# Patient Record
Sex: MALE | Race: WHITE | NOT HISPANIC OR LATINO | Employment: FULL TIME | URBAN - METROPOLITAN AREA
[De-identification: names, ages, dates, MRNs, and addresses within clinical notes are randomized per-mention and may not be internally consistent; named-entity substitution may affect disease eponyms.]

---

## 2018-01-21 ENCOUNTER — OFFICE VISIT (OUTPATIENT)
Dept: URGENT CARE | Facility: CLINIC | Age: 28
End: 2018-01-21
Payer: COMMERCIAL

## 2018-01-21 VITALS
TEMPERATURE: 99 F | HEART RATE: 74 BPM | OXYGEN SATURATION: 95 % | BODY MASS INDEX: 28.44 KG/M2 | SYSTOLIC BLOOD PRESSURE: 116 MMHG | DIASTOLIC BLOOD PRESSURE: 70 MMHG | RESPIRATION RATE: 18 BRPM | HEIGHT: 72 IN | WEIGHT: 210 LBS

## 2018-01-21 DIAGNOSIS — S61.011A LACERATION OF RIGHT THUMB WITHOUT DAMAGE TO NAIL, FOREIGN BODY PRESENCE UNSPECIFIED, INITIAL ENCOUNTER: Primary | ICD-10-CM

## 2018-01-21 PROCEDURE — 99203 OFFICE O/P NEW LOW 30 MIN: CPT | Mod: 25,S$GLB,, | Performed by: FAMILY MEDICINE

## 2018-01-21 PROCEDURE — 12041 INTMD RPR N-HF/GENIT 2.5CM/<: CPT | Mod: S$GLB,,, | Performed by: FAMILY MEDICINE

## 2018-01-21 RX ORDER — AMOXICILLIN AND CLAVULANATE POTASSIUM 875; 125 MG/1; MG/1
1 TABLET, FILM COATED ORAL 2 TIMES DAILY
Qty: 20 TABLET | Refills: 0 | Status: SHIPPED | OUTPATIENT
Start: 2018-01-21 | End: 2018-01-31

## 2018-01-21 RX ORDER — MUPIROCIN 20 MG/G
OINTMENT TOPICAL
Qty: 22 G | Refills: 1 | Status: SHIPPED | OUTPATIENT
Start: 2018-01-21

## 2018-01-21 NOTE — LETTER
January 21, 2018      [facility]  [address] [address]  Phone: [phone]  Fax: [phone]       Patient: Doug Gray   YOB: 1990  Date of Visit: 01/21/2018    To Whom It May Concern:    Jerson Gray  was at [facility] on 01/21/2018. He may return to work/school on 01/25/2018 with limited use of right hand. If you have any questions or concerns, or if I can be of further assistance, please do not hesitate to contact me.    Sincerely,    Darshana Rivera MA

## 2018-01-21 NOTE — PROGRESS NOTES
Subjective:       Patient ID: Doug Gray is a 27 y.o. male.    Vitals:  height is 6' (1.829 m) and weight is 95.3 kg (210 lb). His oral temperature is 98.8 °F (37.1 °C). His blood pressure is 116/70 and his pulse is 74. His respiration is 18 and oxygen saturation is 95%.     Chief Complaint: Laceration    Right thumb laceration today with glass pitcher.  Pt UTD on tetnaus.  Had shot 3 years ago.      Laceration    The incident occurred 1 to 3 hours ago. The laceration is located on the right hand. The laceration is 4 cm in size. The laceration mechanism was a broken glass. The pain is at a severity of 3/10. The pain is mild. The pain has been worsening since onset. It is unknown if a foreign body is present. His tetanus status is UTD.     Review of Systems   Constitution: Negative for weakness and malaise/fatigue.   HENT: Negative for nosebleeds.    Cardiovascular: Negative for chest pain and syncope.   Respiratory: Negative for shortness of breath.    Musculoskeletal: Positive for joint pain. Negative for back pain and neck pain.   Gastrointestinal: Negative for abdominal pain.   Genitourinary: Negative for hematuria.   Neurological: Negative for dizziness and numbness.       Objective:      Physical Exam   Constitutional: He appears well-developed and well-nourished.   HENT:   Head: Normocephalic and atraumatic.   Cardiovascular: Normal rate, regular rhythm and normal heart sounds.    Skin: Laceration (2.5 cm laceration lateral aspect of right thumb, no tendon or nerve involvement. able to appostion without difficulty) noted.   Nursing note and vitals reviewed.      Assessment:       1. Laceration of right thumb without damage to nail, foreign body presence unspecified, initial encounter        Plan:         Laceration of right thumb without damage to nail, foreign body presence unspecified, initial encounter  -     X-Ray Finger 2 or More Views; Future; Expected date: 01/21/2018  -     amoxicillin-clavulanate  875-125mg (AUGMENTIN) 875-125 mg per tablet; Take 1 tablet by mouth 2 (two) times daily.  Dispense: 20 tablet; Refill: 0  -     mupirocin (BACTROBAN) 2 % ointment; Apply to affected area 3 times daily  Dispense: 22 g; Refill: 1  -     LACERATION REPAIR      Return in 4 days for wound check. Discussed signs and symptoms of infection and wound care

## 2018-01-22 NOTE — PATIENT INSTRUCTIONS
Extremity Laceration: Sutures, Staples, or Tape  A laceration is a cut through the skin. If it is deep, it may require stitches (sutures) or staples to close so it can heal. Minor cuts may be treated with surgical tape closures.   X-rays may be done if something may have entered the skin through the cut. You may also need a tetanus shot if you are not up to date on this vaccination.  Home care  · Follow the health care providers instructions on how to care for the cut.  · Wash your hands with soap and warm water before and after caring for your wound. This is to help prevent infection.  · Keep the wound clean and dry. If a bandage was applied and it becomes wet or dirty, replace it. Otherwise, leave it in place for the first 24 hours, then change it once a day or as directed.  · If sutures or staples were used, clean the wound daily:  · After removing the bandage, wash the area with soap and water. Use a wet cotton swab to loosen and remove any blood or crust that forms.  · After cleaning, keep the wound clean and dry. Talk with your doctor before applying any antibiotic ointment to the wound. Reapply the bandage.  · You may remove the bandage to shower as usual after the first 24 hours, but do not soak the area in water (no swimming) until the stitches or staples are removed.  · If surgical tape closures were used, keep the area clean and dry. If it becomes wet, blot it dry with a towel.  · The doctor may prescribe an antibiotic cream or ointment to prevent infection. Do not stop taking this medication until you have finished the prescribed course or the doctor tells you to stop. The doctor may also prescribe medications for pain. Follow the doctors instructions for taking these medications.  · Avoid activities that may reopen your wound.  Follow-up care  Follow up with your health care provider. Most skin wounds heal within ten days. However, an infection may sometimes occur despite proper treatment.  Therefore, check the wound daily for the signs of infection listed below. Stitches and staples should be removed within 7-14 days. If surgical tape closures were used, you may remove them after 10 days if they have not fallen off by then.   When to seek medical advice  Call your health care provider right away if any of these occur:  · Wound bleeding not controlled by direct pressure  · Signs of infection, including increasing pain in the wound, increasing wound redness or swelling, or pus or bad odor coming from the wound  · Fever of 100.4°F (38ºC) or higher or as directed by your healthcare provider  · Stitches or staples come apart or fall out or surgical tape falls off before 7 days  · Wound edges re-open  · Wound changes colors  · Numbness around the wound   · Decreased movement around the injured area  Date Last Reviewed: 6/14/2015  © 8710-5030 [employer]. [address] [place] [address], [place] [address]. All rights reserved. This information is not intended as a substitute for professional medical care. Always follow your healthcare professional's instructions.

## 2018-01-22 NOTE — PROCEDURES
"Laceration Repair  Date/Time: 2018 7:05 PM  Performed by: DANGELO MAURO.  Authorized by: DANGELO MAURO   Consent Done: Yes  Consent: Verbal consent obtained. Written consent obtained.  Risks and benefits: risks, benefits and alternatives were discussed  Consent given by: patient  Patient understanding: patient states understanding of the procedure being performed  Patient consent: the patient's understanding of the procedure matches consent given  Patient identity confirmed:  and name  Time out: Immediately prior to procedure a "time out" was called to verify the correct patient, procedure, equipment, support staff and site/side marked as required.  Body area: upper extremity  Location details: right thumb  Laceration length: 2.5 cm  Foreign bodies: no foreign bodies  Tendon involvement: none  Nerve involvement: none  Vascular damage: no  Anesthesia: local infiltration    Anesthesia:  Local Anesthetic: lidocaine 1% without epinephrine  Anesthetic total: 3 mL  Patient sedated: no  Irrigation solution: saline  Irrigation method: syringe  Amount of cleaning: extensive  Debridement: none  Degree of undermining: none  Skin closure: 3-0 nylon  Number of sutures: 6  Technique: simple  Approximation: close  Approximation difficulty: simple  Dressing: splint for protection, antibiotic ointment and bulky dressing  Patient tolerance: Patient tolerated the procedure well with no immediate complications        "

## 2018-01-25 ENCOUNTER — OFFICE VISIT (OUTPATIENT)
Dept: URGENT CARE | Facility: CLINIC | Age: 28
End: 2018-01-25
Payer: COMMERCIAL

## 2018-01-25 VITALS
HEIGHT: 72 IN | TEMPERATURE: 98 F | OXYGEN SATURATION: 96 % | BODY MASS INDEX: 28.44 KG/M2 | WEIGHT: 210 LBS | SYSTOLIC BLOOD PRESSURE: 104 MMHG | RESPIRATION RATE: 18 BRPM | HEART RATE: 60 BPM | DIASTOLIC BLOOD PRESSURE: 74 MMHG

## 2018-01-25 DIAGNOSIS — S61.011D LACERATION OF RIGHT THUMB WITHOUT FOREIGN BODY WITHOUT DAMAGE TO NAIL, SUBSEQUENT ENCOUNTER: Primary | ICD-10-CM

## 2018-01-25 PROCEDURE — 99213 OFFICE O/P EST LOW 20 MIN: CPT | Mod: 25,S$GLB,, | Performed by: FAMILY MEDICINE

## 2018-01-25 NOTE — PROGRESS NOTES
Subjective:       Patient ID: Doug Gray is a 27 y.o. male.    Vitals:  height is 6' (1.829 m) and weight is 95.3 kg (210 lb). His oral temperature is 97.7 °F (36.5 °C). His blood pressure is 104/74 and his pulse is 60. His respiration is 18 and oxygen saturation is 96%.     Chief Complaint: Wound Check    Pt had stitiches placed 1/21/18.  Here today to have stitches removed.  No pain, redness or swelling to site.      Suture / Staple Removal   The sutures were placed 3 to 6 days ago. He tried oral antibiotics since the wound repair. The treatment provided mild relief. His temperature was unmeasured prior to arrival. There has been colored (yellow) discharge from the wound. There is no redness present. There is no swelling present. There is no pain present. He has no difficulty moving the affected extremity or digit.     Review of Systems   Constitution: Negative for fever.   Eyes: Negative for discharge.   Skin: Negative for unusual hair distribution.       Objective:      Physical Exam   Cardiovascular: Normal rate, regular rhythm and normal heart sounds.    Pulmonary/Chest: Effort normal and breath sounds normal.   Skin: Laceration (healing wound mildly tender, neatly approximated) noted.   Nursing note and vitals reviewed.      Assessment:       1. Laceration of right thumb without foreign body without damage to nail, subsequent encounter        Plan:         Laceration of right thumb without foreign body without damage to nail, subsequent encounter    continue with current management - advised to decrease use of topical antibiotics. To return for signs of infection. To have sutures removed after 10 days

## 2018-01-25 NOTE — PATIENT INSTRUCTIONS
Wound Check, No Infection  Your wound is healing as expected. There are no signs of infection.   Home care  Continue to care for your wound as directed.  · Cover your wound with a bandage unless your healthcare provider tells you not to.  · Gently clean your wound with soap and water when you shower.   · Unless told otherwise, avoid swimming and taking tub baths until your wound has healed.  Follow-up care  Follow up with your healthcare provider as advised.  · If you have sutures or staples, return as directed to have them removed. If they are not taken out on time, they may be harder to remove and scarring may be worse. Infection may develop.  · If surgical tape strips were used, you can remove them yourself if they have not fallen off by 10 days after they were applied.   When to seek medical advice  Call your healthcare provider right away if any of these occur:  · Fever of 100.4ºF (38ºC) or higher, or as directed by your health care provider  · Symptoms of a wound infection, including:  ¨ Redness or swelling around the wound  ¨ Warmth coming from the wound  ¨ New or worsening pain  ¨ Red streaks around the wound  ¨ Draining pus  Date Last Reviewed: 8/24/2015  © 5702-4773 [employer]. [address] [place] [address], [place] [address]. All rights reserved. This information is not intended as a substitute for professional medical care. Always follow your healthcare professional's instructions.

## 2018-01-31 ENCOUNTER — CLINICAL SUPPORT (OUTPATIENT)
Dept: URGENT CARE | Facility: CLINIC | Age: 28
End: 2018-01-31
Payer: COMMERCIAL

## 2018-01-31 VITALS
DIASTOLIC BLOOD PRESSURE: 73 MMHG | OXYGEN SATURATION: 99 % | RESPIRATION RATE: 18 BRPM | HEART RATE: 73 BPM | TEMPERATURE: 97 F | HEIGHT: 72 IN | WEIGHT: 210 LBS | BODY MASS INDEX: 28.44 KG/M2 | SYSTOLIC BLOOD PRESSURE: 112 MMHG

## 2018-01-31 DIAGNOSIS — Z48.02 ENCOUNTER FOR REMOVAL OF SUTURES: Primary | ICD-10-CM

## 2018-01-31 PROCEDURE — 99024 POSTOP FOLLOW-UP VISIT: CPT | Mod: S$GLB,,, | Performed by: FAMILY MEDICINE

## 2018-01-31 NOTE — PATIENT INSTRUCTIONS
Continue using the Bactroban ointment. Keep it clean and dry. Return for new or worsening symptoms: fever, drainage, surrounding redness/warmth.  Suture Removal, Infected Wound  Your sutures are being removed. Your wound has become infected. The wound will not heal properly unless the infection is cleared. Infection in a wound may also spread if it is not treated. In most cases, antibiotic medicines are prescribed to treat a wound infection.  Symptoms of a wound infection include:  · Redness or swelling around the wound  · Warmth coming from the wound  · New or worsening pain  · Red streaks around the wound  · Draining pus  · Fever  Home care  Medicines  · If you were given antibiotics, take them until they are gone or your healthcare provider tells you to stop. It is vital to finish the antibiotics even if you feel better. If you do not finish them, the infection may come back and be harder to treat.  · Take medicine for pain as directed by your healthcare provider.  Wound care  Care for your wound as directed by the healthcare provider. This may include the following:  · Apply a warm compress (clean cloth soaked in hot water) to the infected area for about 5 to 10 minutes at a time. Be very careful not to burn yourself. Test the cloth on a non-infected area to make sure it is not too hot.  · Change the dressing daily. If it becomes wet, stained with wound fluid, or dirty, change it sooner. To change it:  ¨ Wash your hands with soap and water before changing the dressing.  ¨ Carefully remove the dressing and tape. If it sticks to the wound, you may need to wet it a little to remove it. (Do not do this if your healthcare provider has told you not to.)  ¨ Gently clean the wound with clean water (or saline) using gauze, a clean washcloth, or cotton swab.  ¨ Do not use soap, alcohol, peroxide or other cleansers.  ¨ If you were told to dry the wound before putting on a new dressing, gently pat. Do not rub.  ¨ Throw  out the old dressing.  ¨ Wash your hands again before opening the new, clean dressing.  ¨ Wash your hands again when you are done.  Follow-up care  Follow up with your healthcare provider as advised. It is important to keep your follow-up appointment to be certain that the infection is being treated. If a culture was done, you will be notified if the treatment needs to change. Call as directed for the results.  When to seek medical advice  Call your health care provider right away if any of these occur:  · Symptoms of infection don't start to improve within 2 days of starting antibiotics.  · Symptoms of infection get worse.  · New symptoms, such as red streaks around the wound.  · Fever of 100.4°F (38.0°C) or higher for more than 2 days after starting the antibiotics  Date Last Reviewed: 8/10/2015  © 9228-3343 [employer]. [address] [place] [address]. All rights reserved. This information is not intended as a substitute for professional medical care. Always follow your healthcare professional's instructions.

## 2018-01-31 NOTE — PROGRESS NOTES
Subjective:       Patient ID: Doug Gray is a 27 y.o. male.    Vitals:  height is 6' (1.829 m) and weight is 95.3 kg (210 lb). His oral temperature is 97 °F (36.1 °C). His blood pressure is 112/73 and his pulse is 73. His respiration is 18 and oxygen saturation is 99%.     Chief Complaint: Suture / Staple Removal    Patient states he is here to get sutures removed from his right thumb. Patient states suture have been placed in right thumb for 10 days.      Suture / Staple Removal   The sutures were placed 7 to 10 days ago. He tried antibiotic ointment use and oral antibiotics since the wound repair. The treatment provided mild relief. There has been no drainage from the wound. There is no redness present. There is no swelling present. There is no pain present. He has no difficulty moving the affected extremity or digit.     Review of Systems   Constitution: Negative for fever.   Eyes: Negative for discharge.   Skin: Negative for color change, itching, poor wound healing and unusual hair distribution.       Objective:      Physical Exam   Constitutional: He is oriented to person, place, and time. He appears well-developed and well-nourished.   HENT:   Head: Normocephalic and atraumatic. Head is without abrasion, without contusion and without laceration.   Eyes: Conjunctivae, EOM and lids are normal. Pupils are equal, round, and reactive to light.   Neck: Trachea normal, full passive range of motion without pain and phonation normal. Neck supple.   Cardiovascular: Normal rate, regular rhythm and normal heart sounds.    Pulmonary/Chest: Effort normal and breath sounds normal. No stridor. No respiratory distress.   Musculoskeletal: Normal range of motion.   Neurological: He is alert and oriented to person, place, and time.   Skin: Skin is warm, dry and intact. Capillary refill takes less than 2 seconds. No abrasion, no bruising, no burn, no ecchymosis, no laceration, no lesion and no rash noted. No erythema.   Healing  laceration with 5 sutures intact, no drainage/warmth/redness noted    Psychiatric: He has a normal mood and affect. His speech is normal and behavior is normal. Judgment and thought content normal. Cognition and memory are normal.   Nursing note and vitals reviewed.        Suture Removal  Date/Time: 1/31/2018 4:05 PM  Location procedure was performed: [facility]  Performed by: EDDIE TILLMAN  Authorized by: EDDIE TILLMAN   Pre-operative diagnosis: laceration  Body area: upper extremity  Location details: right thumb  Description of findings: healing laceration without complications   Wound Appearance: clean, normal color, nontender and no drainage  Sutures Removed: 5 (pt states initially 6 sutures were placed, one fell out )  Post-removal: dressing applied (pt advised to keep area uncovered but keep clean and dry; continue use of bactroban )  Facility: sutures placed in this facility  Complications: No  Estimated blood loss (mL): 0  Specimens: No  Implants: No  Patient tolerance: Patient tolerated the procedure well with no immediate complications        Assessment:       1. Encounter for removal of sutures        Plan:         Encounter for removal of sutures    Other orders  -     SUTURE REMOVAL    Patient Instructions     Continue using the Bactroban ointment. Keep it clean and dry. Return for new or worsening symptoms: fever, drainage, surrounding redness/warmth.  Suture Removal, Infected Wound  Your sutures are being removed. Your wound has become infected. The wound will not heal properly unless the infection is cleared. Infection in a wound may also spread if it is not treated. In most cases, antibiotic medicines are prescribed to treat a wound infection.  Symptoms of a wound infection include:  · Redness or swelling around the wound  · Warmth coming from the wound  · New or worsening pain  · Red streaks around the wound  · Draining pus  · Fever  Home care  Medicines  · If you were given  antibiotics, take them until they are gone or your healthcare provider tells you to stop. It is vital to finish the antibiotics even if you feel better. If you do not finish them, the infection may come back and be harder to treat.  · Take medicine for pain as directed by your healthcare provider.  Wound care  Care for your wound as directed by the healthcare provider. This may include the following:  · Apply a warm compress (clean cloth soaked in hot water) to the infected area for about 5 to 10 minutes at a time. Be very careful not to burn yourself. Test the cloth on a non-infected area to make sure it is not too hot.  · Change the dressing daily. If it becomes wet, stained with wound fluid, or dirty, change it sooner. To change it:  ¨ Wash your hands with soap and water before changing the dressing.  ¨ Carefully remove the dressing and tape. If it sticks to the wound, you may need to wet it a little to remove it. (Do not do this if your healthcare provider has told you not to.)  ¨ Gently clean the wound with clean water (or saline) using gauze, a clean washcloth, or cotton swab.  ¨ Do not use soap, alcohol, peroxide or other cleansers.  ¨ If you were told to dry the wound before putting on a new dressing, gently pat. Do not rub.  ¨ Throw out the old dressing.  ¨ Wash your hands again before opening the new, clean dressing.  ¨ Wash your hands again when you are done.  Follow-up care  Follow up with your healthcare provider as advised. It is important to keep your follow-up appointment to be certain that the infection is being treated. If a culture was done, you will be notified if the treatment needs to change. Call as directed for the results.  When to seek medical advice  Call your health care provider right away if any of these occur:  · Symptoms of infection don't start to improve within 2 days of starting antibiotics.  · Symptoms of infection get worse.  · New symptoms, such as red streaks around the  wound.  · Fever of 100.4°F (38.0°C) or higher for more than 2 days after starting the antibiotics  Date Last Reviewed: 8/10/2015  © 2455-9688 [employer]. [address] [place] [address], [place] [address]. All rights reserved. This information is not intended as a substitute for professional medical care. Always follow your healthcare professional's instructions.